# Patient Record
Sex: MALE | Race: WHITE | NOT HISPANIC OR LATINO | Employment: UNEMPLOYED | URBAN - METROPOLITAN AREA
[De-identification: names, ages, dates, MRNs, and addresses within clinical notes are randomized per-mention and may not be internally consistent; named-entity substitution may affect disease eponyms.]

---

## 2017-08-25 ENCOUNTER — OFFICE VISIT (OUTPATIENT)
Dept: URGENT CARE | Facility: CLINIC | Age: 22
End: 2017-08-25
Payer: MEDICAID

## 2017-08-25 VITALS
DIASTOLIC BLOOD PRESSURE: 96 MMHG | OXYGEN SATURATION: 100 % | HEART RATE: 100 BPM | TEMPERATURE: 99 F | SYSTOLIC BLOOD PRESSURE: 139 MMHG | BODY MASS INDEX: 28.32 KG/M2 | WEIGHT: 170 LBS | RESPIRATION RATE: 16 BRPM | HEIGHT: 65 IN

## 2017-08-25 DIAGNOSIS — R52 PAIN: ICD-10-CM

## 2017-08-25 DIAGNOSIS — S00.83XA CONTUSION OF FACE, INITIAL ENCOUNTER: Primary | ICD-10-CM

## 2017-08-25 PROCEDURE — 99204 OFFICE O/P NEW MOD 45 MIN: CPT | Mod: S$GLB,,, | Performed by: FAMILY MEDICINE

## 2017-08-25 PROCEDURE — 3008F BODY MASS INDEX DOCD: CPT | Mod: S$GLB,,, | Performed by: FAMILY MEDICINE

## 2017-08-25 RX ORDER — KETOROLAC TROMETHAMINE 10 MG/1
10 TABLET, FILM COATED ORAL EVERY 6 HOURS PRN
Qty: 40 TABLET | Refills: 0 | Status: SHIPPED | OUTPATIENT
Start: 2017-08-25 | End: 2017-08-25

## 2017-08-25 RX ORDER — NAPROXEN 500 MG/1
500 TABLET ORAL 2 TIMES DAILY WITH MEALS
Qty: 20 TABLET | Refills: 0 | Status: SHIPPED | OUTPATIENT
Start: 2017-08-25 | End: 2018-04-11

## 2017-08-25 NOTE — LETTER
August 25, 2017  Jose Galvez Jr.  [address] [place] [address]                [facility]  [place]  [address] [facility] [address]  Phone: [phone]  Fax: [phone] Jose Galvez was seen and treated in our Urgent Care department on 8/25/2017. He may return to work in 2 - 3 days.      If you have any questions or concerns, please don't hesitate to call.    Sincerely,        Rodrigo Aguilar MD

## 2017-08-25 NOTE — PATIENT INSTRUCTIONS
Please drink plenty of fluids.  Please get plenty of rest.  Please return here or go to the Emergency Department for any concerns or worsening of condition.  If you were given wait & see antibiotics, please wait 3-5 days before taking them, and only take them if your symptoms have worsened or not improved.  If you do begin taking the antibiotics, please take them to completion.  If you were prescribed antibiotics, please take them to completion.  If you were prescribed a narcotic medication, do not drive or operate heavy equipment or machinery while taking these medications.      If not allergic, please take over the counter Tylenol (Acetaminophen) and/or Motrin (Ibuprofen) as directed for control of pain and/or fever.    Please follow up with your primary care doctor or specialist as needed.  Yoshi Carrington MD  [phone]    Ophthalmology - [facility]  [address] [place]  [address]  [phone]      Head Injury with Sleep Monitoring (Adult)    You have a head injury. It does not appear serious at this time. But symptoms of a more serious problem, such as mild brain injury (concussion), or bruising or bleeding in the brain, may appear later. For this reason, you and someone caring for you will need to watch for the symptoms listed below. Once at home, also be sure to follow any care instructions youre given.  Home care  Watch for the following symptoms  Someone must stay with you for the next 24 hours (or longer, if directed). If you fall asleep, this person should wake you up every 2 hours to check your symptoms. This is called sleep monitoring. Symptoms to watch for include:  · Headache  · Nausea or vomiting  · Dizziness  · Sensitivity to light or noise  · Unusual sleepiness or grogginess  · Trouble falling asleep  · Personality changes  · Vision changes  · Memory loss  · Confusion  · Trouble walking or clumsiness  · Loss of consciousness (even for a short  time)  · Inability to be awakened  · Stiff neck  · Weakness or numbness in any part of the body  · Seizures  If you develop any of these symptoms, seek emergency medical medical care right away. If none of these symptoms are noted during the first 24 hours, keep watching for symptoms for the next day or so. Ask your provider if someone should stay with you during this time.   General care  · If you were prescribed medicines for pain, use them as directed. Note: Dont use other pain medicines without checking with your provider first.  · To help reduce swelling and pain, apply a cold source to the injured area for up to 20 minutes at a time. Do this as often as directed. Use a cold pack or bag of ice wrapped in a thin towel. Never apply a cold source directly to the skin.  · If you have cuts or scrapes as a result of your injury, care for them as directed.  · For the next 24 hours (or longer, if instructed):  ¨ Dont drink alcohol or use sedatives or other medicines that make you sleepy.  ¨ Dont drive or operate machinery.  ¨ Dont do anything strenuous, such as heavy lifting or straining.  ¨ Limit tasks that require concentration. This includes reading, using a smartphone or computer, watching TV, and playing video games.  ¨ Dont return to sports or other activity that could result in another head injury.  Follow-up care  Follow up with your healthcare provider, or as directed. If imaging tests were done, they will be reviewed by a doctor. You will be told the results and any new findings that may affect your care.  When to seek medical advice  Call your healthcare provider right away if any of these occur:  · Pain doesnt get better or worsens  · New or increased swelling or bruising  · Fever of 100.4°F (38°C) or higher, or as directed by your provider  · Redness, warmth, bleeding, or drainage from the injured area  · Any depression or bony abnormality in the injured area  · Fluid drainage or bleeding from the  nose or ears  Date Last Reviewed: 9/26/2015  © 2870-4323 [employer]. [address] [place] [address], [place] [address]. All rights reserved. This information is not intended as a substitute for professional medical care. Always follow your healthcare professional's instruction.      Facial Contusion  A contusion is another word for a bruise. It happens when small blood vessels break open and leak blood into the nearby area. A facial contusion can result from a bump, hit, or fall. This may happen during sports or an accident. Symptoms of a contusion often include changes in skin color (bruising), swelling, and pain.   The swelling from the contusion should decrease in a few days. Bruising and pain may take several weeks to go away.   Home care  · If you have been prescribed medicines for pain, take them as directed.  · To help reduce swelling and pain, wrap a cold pack or bag of frozen peas in a thin towel. Put it on the injured area for up to 20 minutes. Do this a few times a day until the swelling goes down.   · If you have scrapes or cuts on your face requiring stiches or other closures, care for them as directed.  · For the next 24 hours (or longer if instructed):  ¨ Dont drink alcohol, or use sedatives or medicines that make you sleepy.  ¨ Dont drive or operate machinery.  ¨ Avoid doing anything strenuous. Dont lift or strain.  ¨ Do not return to sports or other activity that could result in another head injury.  Note about concussion  Because the injury was to your head, it is possible that a concussion (mild brain injury) could result. You don't have signs of a concussion at this time. But symptoms can show up later. Be alert for signs and symptoms of a concussion. Seek emergency medical care if any of these develop over the next hours to days:  · Headache  · Nausea or vomiting  · Dizziness  · Sensitivity to light or noise  · Unusual sleepiness or grogginess  · Trouble falling asleep  · Personality  changes  · Vision changes  · Memory loss  · Confusion  · Trouble walking or clumsiness  · Loss of consciousness (even for a short time)  · Inability to be awakened   Follow-up care  Follow up with your healthcare provider or our staff as directed.  When to seek medical advice  Call your healthcare provider right away if any of these occur:  · Swelling or pain that gets worse, not better  · New swelling or pain  · Warmth or drainage from the swollen area or from cuts or scrapes  · Fluid drainage or bleeding from the nose or ears  · Fever of 100.4ºF (38ºC) or higher, or as directed by your healthcare provider  Date Last Reviewed: 5/7/2015  © 8096-9410 [employer]. [address] [place] [address], [place] [address]. All rights reserved. This information is not intended as a substitute for professional medical care. Always follow your healthcare professional's instructions.

## 2017-08-25 NOTE — PROGRESS NOTES
"Subjective:       Patient ID: Jose Galvez Jr. is a 22 y.o. male.    Vitals:  height is 5' 5" (1.651 m) and weight is 77.1 kg (170 lb). His oral temperature is 98.6 °F (37 °C). His blood pressure is 139/96 (abnormal) and his pulse is 100. His respiration is 16 and oxygen saturation is 100%.     Chief Complaint: Eye Injury (right)    Eye Injury    The right eye is affected. This is a new problem. The current episode started today. The problem occurs constantly. The problem has been gradually worsening. The injury mechanism was a direct trauma. The pain is at a severity of 8/10. The pain is moderate. There is no known exposure to pink eye. He does not wear contacts. Associated symptoms include eye redness. Pertinent negatives include no weakness. He has tried nothing for the symptoms. The treatment provided no relief.     Review of Systems   Constitution: Negative for weakness and malaise/fatigue.   HENT: Positive for headaches. Negative for nosebleeds.    Eyes: Positive for redness.   Cardiovascular: Negative for chest pain and syncope.   Respiratory: Negative for shortness of breath.    Musculoskeletal: Positive for falls. Negative for back pain, joint pain and neck pain.   Gastrointestinal: Negative for abdominal pain.   Genitourinary: Negative for hematuria.   Neurological: Negative for dizziness and numbness.       Objective:      Physical Exam   Constitutional: He is oriented to person, place, and time. He appears well-developed and well-nourished. He is cooperative.  Non-toxic appearance. He does not appear ill. No distress.   HENT:   Head: Normocephalic. Head is with abrasion and with right periorbital erythema. Head is without contusion and without laceration.       Right Ear: Hearing, tympanic membrane, external ear and ear canal normal. No hemotympanum.   Left Ear: Hearing, tympanic membrane, external ear and ear canal normal. No hemotympanum.   Nose: Nose normal. No mucosal edema, rhinorrhea or nasal " deformity. No epistaxis. Right sinus exhibits no maxillary sinus tenderness and no frontal sinus tenderness. Left sinus exhibits no maxillary sinus tenderness and no frontal sinus tenderness.   Mouth/Throat: Uvula is midline, oropharynx is clear and moist and mucous membranes are normal. No trismus in the jaw. Normal dentition. No uvula swelling. No posterior oropharyngeal erythema.   Eyes: Conjunctivae, EOM and lids are normal. Pupils are equal, round, and reactive to light. Right eye exhibits no discharge. Left eye exhibits no discharge. No scleral icterus.   Sclera clear bilat   Neck: Trachea normal, normal range of motion, full passive range of motion without pain and phonation normal. Neck supple. No spinous process tenderness and no muscular tenderness present. No neck rigidity. No tracheal deviation present.   Cardiovascular: Normal rate, regular rhythm, normal heart sounds, intact distal pulses and normal pulses.    Pulmonary/Chest: Effort normal and breath sounds normal. No respiratory distress.   Abdominal: Soft. Normal appearance and bowel sounds are normal. He exhibits no distension, no pulsatile midline mass and no mass. There is no tenderness.   Musculoskeletal: Normal range of motion. He exhibits no edema or deformity.   Neurological: He is alert and oriented to person, place, and time. He has normal strength. No cranial nerve deficit or sensory deficit. He exhibits normal muscle tone. He displays no seizure activity. Coordination normal. GCS eye subscore is 4. GCS verbal subscore is 5. GCS motor subscore is 6.   Skin: Skin is warm, dry and intact. Capillary refill takes less than 2 seconds. No abrasion, no bruising, no burn, no ecchymosis and no laceration noted. He is not diaphoretic. No pallor.   Psychiatric: He has a normal mood and affect. His speech is normal and behavior is normal. Judgment and thought content normal. Cognition and memory are normal.   Nursing note and vitals reviewed.           Assessment:       1. Contusion of face, initial encounter    2. Pain        Plan:         Contusion of face, initial encounter    Pain  -     X-Ray Facial Bones  3 Or More View; Future; Expected date: 08/25/2017    Other orders  -     Discontinue: ketorolac (TORADOL) 10 mg tablet; Take 1 tablet (10 mg total) by mouth every 6 (six) hours as needed for Pain.  Dispense: 40 tablet; Refill: 0  -     naproxen (NAPROSYN) 500 MG tablet; Take 1 tablet (500 mg total) by mouth 2 (two) times daily with meals.  Dispense: 20 tablet; Refill: 0      Please drink plenty of fluids.  Please get plenty of rest.  Please return here or go to the Emergency Department for any concerns or worsening of condition.    If not allergic, please take over the counter Tylenol (Acetaminophen) and/or Motrin (Ibuprofen) as directed for control of pain and/or fever.    Apply Ice to injured site for 15 - 20 minutes several times a day.    ** Head Injury precautions every 2 hours for the next 24 hours.  See instructions. **      Please follow up with your primary care doctor or specialist as needed.  Yoshi Carrington MD  [phone]       Ophthalmology - [facility]  [address] [facility] [address] [place]  [address]  [phone]

## 2017-08-28 ENCOUNTER — TELEPHONE (OUTPATIENT)
Dept: URGENT CARE | Facility: CLINIC | Age: 22
End: 2017-08-28

## 2018-02-28 ENCOUNTER — TELEPHONE (OUTPATIENT)
Dept: ADMINISTRATIVE | Facility: HOSPITAL | Age: 23
End: 2018-02-28

## 2018-04-11 ENCOUNTER — OFFICE VISIT (OUTPATIENT)
Dept: INTERNAL MEDICINE | Facility: CLINIC | Age: 23
End: 2018-04-11
Payer: MEDICAID

## 2018-04-11 VITALS
SYSTOLIC BLOOD PRESSURE: 124 MMHG | DIASTOLIC BLOOD PRESSURE: 80 MMHG | OXYGEN SATURATION: 99 % | WEIGHT: 192.69 LBS | RESPIRATION RATE: 16 BRPM | BODY MASS INDEX: 32.1 KG/M2 | HEART RATE: 111 BPM | HEIGHT: 65 IN

## 2018-04-11 DIAGNOSIS — I88.9 LYMPHADENITIS: ICD-10-CM

## 2018-04-11 DIAGNOSIS — R10.31 GROIN PAIN, RIGHT: Primary | ICD-10-CM

## 2018-04-11 PROCEDURE — 99213 OFFICE O/P EST LOW 20 MIN: CPT | Mod: S$PBB,,, | Performed by: INTERNAL MEDICINE

## 2018-04-11 PROCEDURE — 99999 PR PBB SHADOW E&M-EST. PATIENT-LVL III: CPT | Mod: PBBFAC,,, | Performed by: INTERNAL MEDICINE

## 2018-04-11 PROCEDURE — 99213 OFFICE O/P EST LOW 20 MIN: CPT | Mod: PBBFAC | Performed by: INTERNAL MEDICINE

## 2018-04-11 RX ORDER — CEPHALEXIN 500 MG/1
500 CAPSULE ORAL 3 TIMES DAILY
Qty: 30 CAPSULE | Refills: 0 | Status: SHIPPED | OUTPATIENT
Start: 2018-04-11 | End: 2018-04-21

## 2018-04-11 NOTE — PROGRESS NOTES
Subjective:       Patient ID: Jose Galvez Jr. is a 23 y.o. male.    Chief Complaint: Lump in Groin Area    Jose Galvez Jr. is a 23 y.o. male  Here for R groin pain for 2 weeks.  Went to [facility] 2 weeks ago.  Took cipro for 7 days ( BID)   Reports LN decreased in size.  Last week  About 5 days ago LN started hurting again.        Review of Systems   Constitutional: Negative for chills and fever.   HENT: Negative for congestion, postnasal drip and sore throat.    Eyes: Negative for photophobia.   Respiratory: Negative for chest tightness and shortness of breath.    Cardiovascular: Negative for chest pain.   Gastrointestinal: Negative for abdominal distention, abdominal pain, blood in stool and vomiting.   Genitourinary: Negative for dysuria, flank pain and hematuria.   Musculoskeletal: Negative for back pain.   Skin: Negative for pallor.   Neurological: Negative for dizziness, seizures, facial asymmetry, speech difficulty and numbness.   Hematological: Does not bruise/bleed easily.   Psychiatric/Behavioral: Negative for agitation and suicidal ideas. The patient is not nervous/anxious.        Objective:      Physical Exam   Constitutional: He is oriented to person, place, and time. He appears well-developed and well-nourished.   HENT:   Head: Normocephalic and atraumatic.   Neck: No JVD present.   Cardiovascular: Normal rate, regular rhythm and normal heart sounds.    Pulmonary/Chest: Effort normal and breath sounds normal.   Abdominal: Soft. Bowel sounds are normal. There is no tenderness.   Genitourinary:         Genitourinary Comments: 2 cm  bump ;looks like tender LN.     Musculoskeletal: He exhibits no edema.   Neurological: He is alert and oriented to person, place, and time.   Skin: Skin is warm and dry.   Psychiatric: He has a normal mood and affect. His behavior is normal. Judgment and thought content normal.   Nursing note and vitals reviewed.      Assessment:       1. Groin pain,  right    2. Lymphadenitis        Plan:   Jose was seen today for lump in groin area.    Diagnoses and all orders for this visit:    Groin pain, right  -     cephALEXin (KEFLEX) 500 MG capsule; Take 1 capsule (500 mg total) by mouth 3 (three) times daily.    Lymphadenitis  -     cephALEXin (KEFLEX) 500 MG capsule; Take 1 capsule (500 mg total) by mouth 3 (three) times daily.

## 2018-04-17 ENCOUNTER — OFFICE VISIT (OUTPATIENT)
Dept: INTERNAL MEDICINE | Facility: CLINIC | Age: 23
End: 2018-04-17
Payer: MEDICAID

## 2018-04-17 VITALS
DIASTOLIC BLOOD PRESSURE: 88 MMHG | SYSTOLIC BLOOD PRESSURE: 122 MMHG | HEIGHT: 65 IN | HEART RATE: 106 BPM | RESPIRATION RATE: 16 BRPM | OXYGEN SATURATION: 98 % | WEIGHT: 191.38 LBS | BODY MASS INDEX: 31.89 KG/M2

## 2018-04-17 DIAGNOSIS — I88.9 LYMPHADENITIS: ICD-10-CM

## 2018-04-17 DIAGNOSIS — R10.31 GROIN PAIN, RIGHT: Primary | ICD-10-CM

## 2018-04-17 PROCEDURE — 99999 PR PBB SHADOW E&M-EST. PATIENT-LVL III: CPT | Mod: PBBFAC,,, | Performed by: INTERNAL MEDICINE

## 2018-04-17 PROCEDURE — 99213 OFFICE O/P EST LOW 20 MIN: CPT | Mod: S$PBB,,, | Performed by: INTERNAL MEDICINE

## 2018-04-17 PROCEDURE — 99213 OFFICE O/P EST LOW 20 MIN: CPT | Mod: PBBFAC | Performed by: INTERNAL MEDICINE

## 2018-04-17 NOTE — PROGRESS NOTES
Subjective:       Patient ID: Jose Galvez Jr. is a 23 y.o. male.    Chief Complaint: Groin Pain (1 WEEK FOLLOW UP)    Jose Galvez Jr. is a 23 y.o. male  Here for follow up for R groin lymphadenitis.  Taking keflex.  Doing well.        Groin Pain   Pertinent negatives include no abdominal pain, chest pain, chills, dysuria, fever, flank pain, shortness of breath, sore throat or vomiting.     Review of Systems   Constitutional: Negative for chills and fever.   HENT: Negative for congestion, postnasal drip and sore throat.    Eyes: Negative for photophobia.   Respiratory: Negative for chest tightness and shortness of breath.    Cardiovascular: Negative for chest pain.   Gastrointestinal: Negative for abdominal distention, abdominal pain, blood in stool and vomiting.   Genitourinary: Negative for dysuria, flank pain and hematuria.   Musculoskeletal: Negative for back pain.   Skin: Negative for pallor.   Neurological: Negative for dizziness, seizures, facial asymmetry, speech difficulty and numbness.   Hematological: Does not bruise/bleed easily.   Psychiatric/Behavioral: Negative for agitation and suicidal ideas. The patient is not nervous/anxious.        Objective:      Physical Exam   Constitutional: He is oriented to person, place, and time. He appears well-developed and well-nourished.   HENT:   Head: Normocephalic and atraumatic.   Neck: No JVD present.   Cardiovascular: Normal rate, regular rhythm and normal heart sounds.    Pulmonary/Chest: Effort normal and breath sounds normal.   Abdominal: Soft. Bowel sounds are normal. There is no tenderness.   Genitourinary:   Genitourinary Comments: 2 cm  bump ;looks like tender LN.going down   No more tender           Musculoskeletal: He exhibits no edema.   Neurological: He is alert and oriented to person, place, and time.   Skin: Skin is warm and dry.   Psychiatric: He has a normal mood and affect. His behavior is normal. Judgment and thought content normal.    Nursing note and vitals reviewed.      Assessment:       1. Groin pain, right    2. Lymphadenitis        Plan:   Jose was seen today for groin pain.    Diagnoses and all orders for this visit:    Groin pain, right  RESOLVED    Lymphadenitis  Improving   Finish antibiotics.

## 2018-05-23 ENCOUNTER — OFFICE VISIT (OUTPATIENT)
Dept: INTERNAL MEDICINE | Facility: CLINIC | Age: 23
End: 2018-05-23
Payer: MEDICAID

## 2018-05-23 VITALS
HEART RATE: 120 BPM | RESPIRATION RATE: 16 BRPM | HEIGHT: 65 IN | OXYGEN SATURATION: 100 % | DIASTOLIC BLOOD PRESSURE: 88 MMHG | BODY MASS INDEX: 32.18 KG/M2 | SYSTOLIC BLOOD PRESSURE: 118 MMHG | WEIGHT: 193.13 LBS
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
## 2018-05-23 DIAGNOSIS — M54.2 NECK PAIN: ICD-10-CM

## 2018-05-23 PROCEDURE — 99999 PR PBB SHADOW E&M-EST. PATIENT-LVL III: CPT | Mod: PBBFAC,,, | Performed by: INTERNAL MEDICINE

## 2018-05-23 PROCEDURE — 99213 OFFICE O/P EST LOW 20 MIN: CPT | Mod: PBBFAC | Performed by: INTERNAL MEDICINE

## 2018-05-23 PROCEDURE — 99213 OFFICE O/P EST LOW 20 MIN: CPT | Mod: S$PBB,,, | Performed by: INTERNAL MEDICINE

## 2018-05-23 RX ORDER — CYCLOBENZAPRINE HCL 10 MG
5 TABLET ORAL 3 TIMES DAILY PRN
Qty: 30 TABLET | Refills: 0 | Status: SHIPPED | OUTPATIENT
Start: 2018-05-23 | End: 2018-06-02

## 2018-05-23 NOTE — PROGRESS NOTES
Subjective:       Patient ID: Jose Galvez Jr. is a 23 y.o. male.    Chief Complaint: Neck Pain and Fall    Jose Galvez Jr. is a 23 y.o. male  Went to [facility] .  CT scan neck back done; nothing broken .        Neck Pain    Pertinent negatives include no photophobia or trouble swallowing.   Fall   The accident occurred 3 to 5 days ago. Fall occurred: at home fell ;  He fell from a height of 3 to 5 ft. Point of impact: R hand  Pain location: neck and back  The pain is at a severity of 4/10. The pain is moderate. Pertinent negatives include no hematuria, nausea or vomiting. He has tried acetaminophen for the symptoms. The treatment provided mild relief.     Review of Systems   Constitutional: Positive for activity change. Negative for appetite change, chills, fatigue and unexpected weight change.   HENT: Negative for congestion, postnasal drip, rhinorrhea, sore throat and trouble swallowing.    Eyes: Negative for photophobia, pain and visual disturbance.   Respiratory: Negative for cough and shortness of breath.    Cardiovascular: Negative for palpitations.   Gastrointestinal: Negative for constipation, diarrhea, nausea and vomiting.   Genitourinary: Negative for dysuria, frequency, hematuria and urgency.   Musculoskeletal: Positive for back pain and neck pain. Negative for gait problem.   Skin: Negative for rash and wound.   Neurological: Negative for dizziness, seizures, syncope and light-headedness.   Psychiatric/Behavioral: Negative for agitation, behavioral problems, confusion and decreased concentration.       Objective:      Physical Exam   Constitutional: He is oriented to person, place, and time. He appears well-developed and well-nourished.   HENT:   Head: Normocephalic and atraumatic.   Neck: No JVD present. Spinous process tenderness and muscular tenderness present. Decreased range of motion present.       Cardiovascular: Normal rate, regular rhythm and normal heart sounds.     Pulmonary/Chest: Effort normal and breath sounds normal.   Abdominal: Soft. Bowel sounds are normal. There is no tenderness.   Musculoskeletal: He exhibits no edema.   Neurological: He is alert and oriented to person, place, and time.   Skin: Skin is warm and dry.   Psychiatric: He has a normal mood and affect. His behavior is normal. Judgment and thought content normal.   Nursing note and vitals reviewed.      Assessment:       1. Fall, initial encounter    2. Neck pain        Plan:   Jose was seen today for neck pain and fall.    Diagnoses and all orders for this visit:    Fall, initial encounter  -     cyclobenzaprine (FLEXERIL) 10 MG tablet; Take 0.5 tablets (5 mg total) by mouth 3 (three) times daily as needed for Muscle spasms.    Neck pain  -     cyclobenzaprine (FLEXERIL) 10 MG tablet; Take 0.5 tablets (5 mg total) by mouth 3 (three) times daily as needed for Muscle spasms.    apply heating pad.

## 2018-06-06 ENCOUNTER — OFFICE VISIT (OUTPATIENT)
Dept: INTERNAL MEDICINE | Facility: CLINIC | Age: 23
End: 2018-06-06
Payer: MEDICAID

## 2018-06-06 VITALS
DIASTOLIC BLOOD PRESSURE: 70 MMHG | OXYGEN SATURATION: 99 % | BODY MASS INDEX: 32.61 KG/M2 | SYSTOLIC BLOOD PRESSURE: 110 MMHG | RESPIRATION RATE: 16 BRPM | HEART RATE: 127 BPM | HEIGHT: 65 IN | WEIGHT: 195.75 LBS

## 2018-06-06 DIAGNOSIS — M54.2 NECK PAIN: Primary | ICD-10-CM

## 2018-06-06 PROCEDURE — 99213 OFFICE O/P EST LOW 20 MIN: CPT | Mod: S$PBB,,, | Performed by: INTERNAL MEDICINE

## 2018-06-06 PROCEDURE — 99213 OFFICE O/P EST LOW 20 MIN: CPT | Mod: PBBFAC | Performed by: INTERNAL MEDICINE

## 2018-06-06 PROCEDURE — 99999 PR PBB SHADOW E&M-EST. PATIENT-LVL III: CPT | Mod: PBBFAC,,, | Performed by: INTERNAL MEDICINE

## 2018-06-06 RX ORDER — CYCLOBENZAPRINE HCL 5 MG
5 TABLET ORAL 3 TIMES DAILY PRN
Qty: 30 TABLET | Refills: 0 | Status: SHIPPED | OUTPATIENT
Start: 2018-06-06 | End: 2018-06-16

## 2018-06-06 NOTE — PROGRESS NOTES
"Subjective:       Patient ID: Jose Galvez Jr. is a 23 y.o. male.    Chief Complaint: Fall (2 week follow up) and Back Pain    Jose Galvez Jr. is a 23 y.o. male  Here for follow up for his neck and back issues.  Reports feeling better : "I dont feel bruised and moving arms better "  flexaril helped.    I reviewed his CT scans with him from [facility]; no fractures.        Fall   Pertinent negatives include no hematuria, nausea or vomiting.   Back Pain   Pertinent negatives include no dysuria.     Review of Systems   Constitutional: Positive for activity change. Negative for appetite change, chills, fatigue and unexpected weight change.   HENT: Negative for congestion, postnasal drip, rhinorrhea, sore throat and trouble swallowing.    Eyes: Negative for photophobia, pain and visual disturbance.   Respiratory: Negative for cough and shortness of breath.    Cardiovascular: Negative for palpitations.   Gastrointestinal: Negative for constipation, diarrhea, nausea and vomiting.   Genitourinary: Negative for dysuria, frequency, hematuria and urgency.   Musculoskeletal: Positive for back pain and neck pain. Negative for gait problem.   Skin: Negative for rash and wound.   Neurological: Negative for dizziness, seizures, syncope and light-headedness.   Psychiatric/Behavioral: Negative for agitation, behavioral problems, confusion and decreased concentration.       Objective:      Physical Exam   Constitutional: He is oriented to person, place, and time. He appears well-developed and well-nourished.   HENT:   Head: Normocephalic and atraumatic.   Neck: No JVD present. Spinous process tenderness and muscular tenderness present. Decreased range of motion present.       Cardiovascular: Normal rate, regular rhythm and normal heart sounds.    Pulmonary/Chest: Effort normal and breath sounds normal.   Abdominal: Soft. Bowel sounds are normal. There is no tenderness.   Musculoskeletal: He exhibits no edema.   Neurological: " He is alert and oriented to person, place, and time.   Skin: Skin is warm and dry.   Psychiatric: He has a normal mood and affect. His behavior is normal. Judgment and thought content normal.   Nursing note and vitals reviewed.      Assessment:       1. Neck pain        Plan:     Neck pain  -     cyclobenzaprine (FLEXERIL) 5 MG tablet; Take 1 tablet (5 mg total) by mouth 3 (three) times daily as needed for Muscle spasms.  Dispense: 30 tablet; Refill: 0    looks like he bruised his neck bad;  Medicaid does not pay for PT   Ct scans show no fractures  He has good  in hands.  subjective symptoms for numbness .  Continue flexaril Call or return to clinic prn if these symptoms worsen or fail to improve as anticipated. For pain

## 2018-07-09 ENCOUNTER — TELEPHONE (OUTPATIENT)
Dept: INTERNAL MEDICINE | Facility: CLINIC | Age: 23
End: 2018-07-09

## 2018-07-09 DIAGNOSIS — M54.2 NECK PAIN: ICD-10-CM

## 2018-07-09 DIAGNOSIS — M54.40 ACUTE LOW BACK PAIN WITH SCIATICA, SCIATICA LATERALITY UNSPECIFIED, UNSPECIFIED BACK PAIN LATERALITY: Primary | ICD-10-CM

## 2018-07-09 NOTE — TELEPHONE ENCOUNTER
----- Message from Radha Funk sent at 2018 11:03 AM CDT -----  Contact: Self  Jose Galvez Jr.  MRN: [id number]  : 1995  PCP: Citlali Coello  Home Phone      [phone]  Work Phone      Not on file.  Mobile          [phone]    MESSAGE:   Not doing any better since his last visit.   Still experiencing really bad pain and burning sensations in back and neck.  Did not want to schedule appointment to come back in until he finds out what doctor recommends.  Please call.     Phone: [phone]

## 2018-07-11 ENCOUNTER — HOSPITAL ENCOUNTER (OUTPATIENT)
Dept: RADIOLOGY | Facility: HOSPITAL | Age: 23
Discharge: HOME | End: 2018-07-11
Attending: INTERNAL MEDICINE
Payer: MEDICAID

## 2018-07-11 DIAGNOSIS — M54.2 NECK PAIN: ICD-10-CM

## 2018-07-11 DIAGNOSIS — M54.40 ACUTE LOW BACK PAIN WITH SCIATICA, SCIATICA LATERALITY UNSPECIFIED, UNSPECIFIED BACK PAIN LATERALITY: ICD-10-CM

## 2018-07-11 PROCEDURE — 72100 X-RAY EXAM L-S SPINE 2/3 VWS: CPT | Mod: 26,,, | Performed by: RADIOLOGY

## 2018-07-11 PROCEDURE — 72100 X-RAY EXAM L-S SPINE 2/3 VWS: CPT | Mod: TC

## 2018-07-11 PROCEDURE — 72050 X-RAY EXAM NECK SPINE 4/5VWS: CPT | Mod: TC

## 2018-07-11 PROCEDURE — 72050 X-RAY EXAM NECK SPINE 4/5VWS: CPT | Mod: 26,,, | Performed by: RADIOLOGY

## 2018-07-12 ENCOUNTER — TELEPHONE (OUTPATIENT)
Dept: INTERNAL MEDICINE | Facility: CLINIC | Age: 23
End: 2018-07-12

## 2018-07-12 DIAGNOSIS — M54.2 NECK PAIN: Primary | ICD-10-CM

## 2018-07-12 DIAGNOSIS — M54.9 BACK PAIN, UNSPECIFIED BACK LOCATION, UNSPECIFIED BACK PAIN LATERALITY, UNSPECIFIED CHRONICITY: ICD-10-CM

## 2018-07-12 NOTE — TELEPHONE ENCOUNTER
Called and spoke with pt. Informed pt that pcp reviewed message about neck pain, and did place a referral for pain management. Instructed pt to call insurance company and find out if there are any pain management doctors that will accept his insurance. Instructed once he gets a name or number to call clinic so referral can be faxed. Pt verbalized understanding.

## 2018-07-12 NOTE — TELEPHONE ENCOUNTER
----- Message from Cha Arzola MA sent at 7/11/2018  4:20 PM CDT -----  Patient notified of results and is asking what is the next step. Patient stated he's still having a burning sensation in the neck.

## 2018-07-17 ENCOUNTER — TELEPHONE (OUTPATIENT)
Dept: INTERNAL MEDICINE | Facility: CLINIC | Age: 23
End: 2018-07-17

## 2018-07-17 DIAGNOSIS — G89.29 CHRONIC NECK PAIN: Primary | ICD-10-CM

## 2018-07-17 DIAGNOSIS — M54.2 CHRONIC NECK PAIN: Primary | ICD-10-CM

## 2018-07-17 NOTE — TELEPHONE ENCOUNTER
----- Message from Lita Doran sent at 2018  3:21 PM CDT -----  Contact: SELF  Jose Galvez Jr.  MRN: [id number]  : 1995  PCP: Citlali Coello  Home Phone      [phone]  Work Phone      Not on file.  Mobile          [phone]      MESSAGE: SAID HE NEEDS TO SPEAK WITH NURSE REGARDING PAIN MGMT. SAID THAT DR COELLO TOLD HIM TO CHECK W/ INSURANCE TO SEE IF THEY WOULD COVER PAIN MGMT VISIT, AND HE SAID THEY DO NOT. THAT THEY WOULD COVER MEDICATIONS, BUT NOT APPTS. PLEASE CALL P    PHONE: [phone]

## 2018-07-17 NOTE — TELEPHONE ENCOUNTER
Informed patient of orthopedic referral. Message sent to orthopedic pool @ [facility] for appt scheduling.

## 2018-07-17 NOTE — TELEPHONE ENCOUNTER
"Patient's insurance does not cover pain management and they suggested that he "work with his primary care doctor". Please advise if you would like to have patient schedule an appt with you to discuss options or refer to another doctor for neck pain. Thanks.   "

## 2018-07-18 ENCOUNTER — TELEPHONE (OUTPATIENT)
Dept: INTERNAL MEDICINE | Facility: CLINIC | Age: 23
End: 2018-07-18

## 2018-07-18 DIAGNOSIS — M54.2 CHRONIC NECK PAIN: Primary | ICD-10-CM

## 2018-07-18 DIAGNOSIS — G89.29 CHRONIC NECK PAIN: Primary | ICD-10-CM

## 2018-07-18 NOTE — TELEPHONE ENCOUNTER
----- Message from Lita Doran sent at 2018  9:17 AM CDT -----  Contact: SELF  Jose Galvez Jr.  MRN: [id number]  : 1995  PCP: Citlali Coello  Home Phone      [phone]  Work Phone      Not on file.  Mobile          [phone]      MESSAGE: WANTS TO KNOW IF HE CAN GET A REFERRAL TO A NEUROLOGIST FOR HIS ONGOING NECK ISSUES. WANTS TO SEE DR FARHEEN GOLD (DONT KNOW IF THAT'S SPELLED CORRECTLY). PLEASE CALL     PHONE:[phone]    PHONE: [phone]

## 2018-07-18 NOTE — TELEPHONE ENCOUNTER
Patient notified that referral has been done. Referral faxed to Dr. Brown's office @ [phone] per patient request.

## 2018-07-18 NOTE — TELEPHONE ENCOUNTER
"Patient requesting referral to Dr. Justin Brown for "ongoing neck issues". Please advise. Thanks.   "

## 2018-12-19 ENCOUNTER — TELEPHONE (OUTPATIENT)
Dept: INTERNAL MEDICINE | Facility: CLINIC | Age: 23
End: 2018-12-19

## 2018-12-19 NOTE — TELEPHONE ENCOUNTER
----- Message from Radha Funk sent at 2018 11:24 AM CST -----  Contact: Self  Jose Galvez Jr.  MRN: [id number]  : 1995  PCP: Citlali Coello  Home Phone      [phone]  Work Phone      Not on file.  Mobile          [phone]    Message left on voicemail:    MESSAGE:   Calling to find out information on his referral to Neurologist.  He is not sure where it is scheduled for or who it is scheduled with.  Please call to advise.    Phone: [phone]

## 2018-12-19 NOTE — TELEPHONE ENCOUNTER
I informed patient that he was referred to Dr. Brown's office in July and that he should follow up with them regarding the appt.

## 2019-04-03 ENCOUNTER — OFFICE VISIT (OUTPATIENT)
Dept: INTERNAL MEDICINE | Facility: CLINIC | Age: 24
End: 2019-04-03
Payer: MEDICAID

## 2019-04-03 VITALS
DIASTOLIC BLOOD PRESSURE: 72 MMHG | HEIGHT: 65 IN | SYSTOLIC BLOOD PRESSURE: 104 MMHG | BODY MASS INDEX: 32.1 KG/M2 | WEIGHT: 192.69 LBS | HEART RATE: 114 BPM | RESPIRATION RATE: 14 BRPM | OXYGEN SATURATION: 98 %

## 2019-04-03 DIAGNOSIS — M26.609 TMJ (TEMPOROMANDIBULAR JOINT SYNDROME): Primary | ICD-10-CM

## 2019-04-03 DIAGNOSIS — F42.9 OBSESSIVE-COMPULSIVE DISORDER, UNSPECIFIED TYPE: ICD-10-CM

## 2019-04-03 PROCEDURE — 99213 OFFICE O/P EST LOW 20 MIN: CPT | Mod: S$PBB,,, | Performed by: INTERNAL MEDICINE

## 2019-04-03 PROCEDURE — 99999 PR PBB SHADOW E&M-EST. PATIENT-LVL IV: ICD-10-PCS | Mod: PBBFAC,,, | Performed by: INTERNAL MEDICINE

## 2019-04-03 PROCEDURE — 99213 PR OFFICE/OUTPT VISIT, EST, LEVL III, 20-29 MIN: ICD-10-PCS | Mod: S$PBB,,, | Performed by: INTERNAL MEDICINE

## 2019-04-03 PROCEDURE — 99999 PR PBB SHADOW E&M-EST. PATIENT-LVL IV: CPT | Mod: PBBFAC,,, | Performed by: INTERNAL MEDICINE

## 2019-04-03 PROCEDURE — 99214 OFFICE O/P EST MOD 30 MIN: CPT | Mod: PBBFAC | Performed by: INTERNAL MEDICINE

## 2019-04-03 NOTE — PROGRESS NOTES
Subjective:       Patient ID: Jose Galvez Jr. is a 24 y.o. male.    Chief Complaint: OCD and Pre-op Exam (TMJ)    Jose Galvez Jr. is a 23yo male who presents for preop authorization for his TMJ.  According to his dentist (Dr. Yoshi Stevens) it appears that his TMJ is the source of the back and neck pain that he has been dealing with for the past 4 years.  He is seeking referral to oral surgery (Dr. Cummings).  Flexaril has not helped when he tried in the past.  He is currently trying a dental splint, he states that it does not seem to be helping.    He also reports that OCD symptoms have been getting worse.  He has been having intrusive thoughts and compulsions.  He must wash his hands (30x a day), lock doors.  He worries about catastrophy and illness.  He is interested in getting help with this.    Review of Systems   Constitutional: Negative for appetite change, chills, fatigue and unexpected weight change.   HENT: Negative for congestion, postnasal drip, rhinorrhea, sore throat and trouble swallowing.    Eyes: Negative for photophobia, pain and visual disturbance.   Respiratory: Negative for cough and shortness of breath.    Cardiovascular: Negative for palpitations.   Gastrointestinal: Positive for nausea and vomiting. Negative for constipation and diarrhea.   Genitourinary: Negative for dysuria, frequency, hematuria and urgency.   Musculoskeletal: Positive for arthralgias, back pain and neck pain. Negative for gait problem.   Skin: Negative for rash and wound.   Neurological: Positive for headaches. Negative for dizziness, seizures, syncope, weakness and light-headedness.   Psychiatric/Behavioral: Negative for agitation, behavioral problems, confusion and decreased concentration. The patient is nervous/anxious.        Objective:      Physical Exam   Constitutional: He is oriented to person, place, and time. He appears well-developed and well-nourished.   HENT:   Head: Normocephalic and atraumatic.    Neck: No JVD present. Spinous process tenderness and muscular tenderness present. Decreased range of motion present.       Cardiovascular: Normal rate, regular rhythm and normal heart sounds.   Pulmonary/Chest: Effort normal and breath sounds normal.   Abdominal: Soft. Bowel sounds are normal. There is no tenderness.   Musculoskeletal: He exhibits no edema.   Neurological: He is alert and oriented to person, place, and time.   Skin: Skin is warm and dry.   Psychiatric: He has a normal mood and affect. His behavior is normal. Judgment and thought content normal.   Nursing note and vitals reviewed.      Assessment:       1. TMJ (temporomandibular joint syndrome)    2. Obsessive-compulsive disorder, unspecified type        Plan:   Jose was seen today for ocd and pre-op exam.    Diagnoses and all orders for this visit:    TMJ (temporomandibular joint syndrome)  -     Ambulatory consult to Oral Maxillofacial Surgery    Obsessive-compulsive disorder, unspecified type  -     Ambulatory referral to Psychiatry          Problem List Items Addressed This Visit     None      Visit Diagnoses     TMJ (temporomandibular joint syndrome)    -  Primary    Relevant Orders    Ambulatory consult to Oral Maxillofacial Surgery    Obsessive-compulsive disorder, unspecified type        Relevant Orders    Ambulatory referral to Psychiatry

## 2019-07-23 ENCOUNTER — TELEPHONE (OUTPATIENT)
Dept: INTERNAL MEDICINE | Facility: CLINIC | Age: 24
End: 2019-07-23

## 2019-07-23 NOTE — TELEPHONE ENCOUNTER
Pt states he will use the oral surgeon at [facility]. Pt asked for clinic to contact them at this number and fax referral.    [phone]    Attempted to call the number and busy tone rang. Attempted to call again and fax tone was heard.

## 2019-07-23 NOTE — TELEPHONE ENCOUNTER
----- Message from Radha Funk sent at 2019  1:27 PM CDT -----  Contact: Self  Jose Galvez Jr.  MRN: [id number]  : 1995  PCP: Citlali Coello  Home Phone      [phone]  Work Phone      Not on file.  Mobile          [phone]    MESSAGE:   Calling to check status of his request for a referral to oral surgeon.  States that he called a few weeks ago and he has not heard back from our office yet. Please call to advise.    Phone: [phone]

## 2019-07-24 NOTE — TELEPHONE ENCOUNTER
Advised pt to contact insurance and ask for an oral surgeon who will take his insurance. Advised pt to get the name of the facility, name of the doctor, and the phone number. Pt verbalized understanding.

## 2020-10-05 ENCOUNTER — PATIENT MESSAGE (OUTPATIENT)
Dept: ADMINISTRATIVE | Facility: HOSPITAL | Age: 25
End: 2020-10-05

## 2020-10-06 ENCOUNTER — TELEPHONE (OUTPATIENT)
Dept: INTERNAL MEDICINE | Facility: CLINIC | Age: 25
End: 2020-10-06

## 2020-10-06 NOTE — TELEPHONE ENCOUNTER
Spoke with patient. Notified him that we cannot prescribe medications for him since he has not been seen in over a year. Patient refuses appointment.

## 2020-10-06 NOTE — TELEPHONE ENCOUNTER
----- Message from Sil Baptiste sent at 10/6/2020 12:30 PM CDT -----  Regarding: Reqeust to speak to a nurse  Contact: self  Jose Galvez Jr.  MRN: [id number]  : 1995  PCP: Citlali Coello  Home Phone      [phone]  Work Phone      Not on file.  Mobile          [phone]      MESSAGE:    Request to speak to a nurse regarding blood pressure, and anxiety.   Past last PCP appointment 4/3/2019 - Declined appointment.     Phone # [phone]    Pharmacy - [facility]

## 2021-01-04 ENCOUNTER — PATIENT MESSAGE (OUTPATIENT)
Dept: ADMINISTRATIVE | Facility: HOSPITAL | Age: 26
End: 2021-01-04

## 2021-04-05 ENCOUNTER — PATIENT MESSAGE (OUTPATIENT)
Dept: ADMINISTRATIVE | Facility: HOSPITAL | Age: 26
End: 2021-04-05

## 2021-05-04 ENCOUNTER — PATIENT MESSAGE (OUTPATIENT)
Dept: RESEARCH | Facility: HOSPITAL | Age: 26
End: 2021-05-04

## 2021-07-06 ENCOUNTER — PATIENT MESSAGE (OUTPATIENT)
Dept: ADMINISTRATIVE | Facility: HOSPITAL | Age: 26
End: 2021-07-06

## 2022-02-10 ENCOUNTER — PATIENT MESSAGE (OUTPATIENT)
Dept: ADMINISTRATIVE | Facility: HOSPITAL | Age: 27
End: 2022-02-10
Payer: MEDICAID